# Patient Record
Sex: FEMALE
[De-identification: names, ages, dates, MRNs, and addresses within clinical notes are randomized per-mention and may not be internally consistent; named-entity substitution may affect disease eponyms.]

---

## 2020-10-13 ENCOUNTER — HOSPITAL ENCOUNTER (OUTPATIENT)
Dept: HOSPITAL 92 - BICMRI | Age: 40
Discharge: HOME | End: 2020-10-13
Attending: FAMILY MEDICINE
Payer: COMMERCIAL

## 2020-10-13 DIAGNOSIS — M51.36: ICD-10-CM

## 2020-10-13 DIAGNOSIS — M54.5: Primary | ICD-10-CM

## 2020-10-13 PROCEDURE — 72148 MRI LUMBAR SPINE W/O DYE: CPT
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
## 2024-11-01 ENCOUNTER — HOSPITAL ENCOUNTER (OUTPATIENT)
Dept: HOSPITAL 92 - BICRAD | Age: 44
Discharge: HOME | End: 2024-11-01
Attending: FAMILY MEDICINE
Payer: COMMERCIAL

## 2024-11-01 DIAGNOSIS — M25.572: ICD-10-CM

## 2024-11-01 DIAGNOSIS — M25.551: ICD-10-CM

## 2024-11-01 DIAGNOSIS — M25.552: Primary | ICD-10-CM

## 2024-11-01 DIAGNOSIS — M25.562: ICD-10-CM

## 2024-11-01 DIAGNOSIS — M79.605: ICD-10-CM
